# Patient Record
Sex: FEMALE
[De-identification: names, ages, dates, MRNs, and addresses within clinical notes are randomized per-mention and may not be internally consistent; named-entity substitution may affect disease eponyms.]

---

## 2017-08-28 ENCOUNTER — HOSPITAL ENCOUNTER (OUTPATIENT)
Dept: HOSPITAL 42 - CATH | Age: 67
Discharge: HOME | End: 2017-08-28
Payer: MEDICARE

## 2017-08-28 VITALS — SYSTOLIC BLOOD PRESSURE: 130 MMHG | RESPIRATION RATE: 18 BRPM | DIASTOLIC BLOOD PRESSURE: 67 MMHG | HEART RATE: 78 BPM

## 2017-08-28 VITALS — OXYGEN SATURATION: 98 %

## 2017-08-28 VITALS — BODY MASS INDEX: 44.9 KG/M2

## 2017-08-28 VITALS — TEMPERATURE: 98.2 F

## 2017-08-28 DIAGNOSIS — I10: ICD-10-CM

## 2017-08-28 DIAGNOSIS — E66.9: ICD-10-CM

## 2017-08-28 DIAGNOSIS — E11.9: ICD-10-CM

## 2017-08-28 DIAGNOSIS — E78.5: ICD-10-CM

## 2017-08-28 DIAGNOSIS — R94.39: Primary | ICD-10-CM

## 2017-08-28 DIAGNOSIS — Z79.4: ICD-10-CM

## 2017-08-28 DIAGNOSIS — R20.0: ICD-10-CM

## 2017-08-28 LAB
APTT BLD: 25.7 SECONDS (ref 23.7–30.8)
BASOPHILS # BLD AUTO: 0.02 K/MM3 (ref 0–2)
BASOPHILS NFR BLD: 0.4 % (ref 0–3)
BUN SERPL-MCNC: 15 MG/DL (ref 7–21)
CALCIUM SERPL-MCNC: 9.9 MG/DL (ref 8.4–10.5)
CHLORIDE SERPL-SCNC: 105 MMOL/L (ref 98–107)
CHOLEST SERPL-MCNC: 159 MG/DL (ref 130–200)
CO2 SERPL-SCNC: 28 MMOL/L (ref 21–33)
EOSINOPHIL # BLD: 0.2 10*3/UL (ref 0–0.7)
EOSINOPHIL NFR BLD: 3.1 % (ref 1.5–5)
ERYTHROCYTE [DISTWIDTH] IN BLOOD BY AUTOMATED COUNT: 13.6 % (ref 11.5–14.5)
GLUCOSE SERPL-MCNC: 128 MG/DL (ref 70–110)
GRANULOCYTES # BLD: 2.65 10*3/UL (ref 1.4–6.5)
GRANULOCYTES NFR BLD: 53.9 % (ref 50–68)
HCT VFR BLD CALC: 34.5 % (ref 36–48)
INR PPP: 0.99 (ref 0.93–1.08)
LYMPHOCYTES # BLD: 1.9 10*3/UL (ref 1.2–3.4)
LYMPHOCYTES NFR BLD AUTO: 37.7 % (ref 22–35)
MCH RBC QN AUTO: 29.4 PG (ref 25–35)
MCHC RBC AUTO-ENTMCNC: 33.3 G/DL (ref 31–37)
MCV RBC AUTO: 88.2 FL (ref 80–105)
MONOCYTES # BLD AUTO: 0.2 10*3/UL (ref 0.1–0.6)
MONOCYTES NFR BLD: 4.9 % (ref 1–6)
PLATELET # BLD: 376 10^3/UL (ref 120–450)
PMV BLD AUTO: 9.6 FL (ref 7–11)
POTASSIUM SERPL-SCNC: 4 MMOL/L (ref 3.6–5)
SODIUM SERPL-SCNC: 143 MMOL/L (ref 132–148)
WBC # BLD AUTO: 4.9 10^3/UL (ref 4.5–11)

## 2017-08-28 PROCEDURE — 85610 PROTHROMBIN TIME: CPT

## 2017-08-28 PROCEDURE — 80048 BASIC METABOLIC PNL TOTAL CA: CPT

## 2017-08-28 PROCEDURE — 80061 LIPID PANEL: CPT

## 2017-08-28 PROCEDURE — 86850 RBC ANTIBODY SCREEN: CPT

## 2017-08-28 PROCEDURE — 85025 COMPLETE CBC W/AUTO DIFF WBC: CPT

## 2017-08-28 PROCEDURE — 85730 THROMBOPLASTIN TIME PARTIAL: CPT

## 2017-08-28 PROCEDURE — 86900 BLOOD TYPING SEROLOGIC ABO: CPT

## 2017-08-28 PROCEDURE — 36415 COLL VENOUS BLD VENIPUNCTURE: CPT

## 2017-08-28 PROCEDURE — 99152 MOD SED SAME PHYS/QHP 5/>YRS: CPT

## 2017-08-28 PROCEDURE — 93458 L HRT ARTERY/VENTRICLE ANGIO: CPT

## 2017-08-28 PROCEDURE — 99153 MOD SED SAME PHYS/QHP EA: CPT

## 2017-08-28 NOTE — CP.PCM.PN
Subjective





- Date & Time of Evaluation


Date of Evaluation: 08/28/17


Time of Evaluation: 20:37





- Subjective


Subjective: 








 Patient was seen at bedside.


 She complained of numbness in right hand.


 Numbness is present since the procedure, not increasing.


 No pain, no weakness, no swelling.


 ROS:Negative except a s mentioned above.


 66 year old woman is here for cardiac cath, is S/P cardiac cath.


 Has PMH of CAD, obesity , 


 





Objective





- Vital Signs/Intake and Output


Vital Signs (last 24 hours): 


 











Temp Pulse Resp BP Pulse Ox


 


 98.2 F   86   18   152/77 H  98 


 


 08/28/17 19:30  08/28/17 19:30  08/28/17 19:30  08/28/17 19:30  08/28/17 12:00











- Medications


Medications: 


 Current Medications





Acetaminophen (Tylenol 325mg Tab)  650 mg PO Q4H PRN


   PRN Reason: Pain, Mild (1-3)


Sodium Chloride (Sodium Chloride 0.9%)  1,000 mls @ 100 mls/hr IV .Q10H [id number]











- Labs


Labs: 


 





 08/28/17 12:00 





 08/28/17 12:00 





 











PT  10.7 Seconds (9.9-11.8)   08/28/17  12:00    


 


INR  0.99  (0.93-1.08)   08/28/17  12:00    


 


APTT  25.7 Seconds (23.7-30.8)   08/28/17  12:00    














- Constitutional


Appears: Well, No Acute Distress





- Head Exam


Head Exam: ATRAUMATIC, NORMAL INSPECTION, NORMOCEPHALIC


Additional comments: 





Obese person , sitting in chair.





- Eye Exam


Eye Exam: Normal appearance





- ENT Exam


ENT Exam: Normal External Ear Exam





- Neck Exam


Neck Exam: Normal Inspection





- Respiratory Exam


Respiratory Exam: NORMAL BREATHING PATTERN





- Cardiovascular Exam


Cardiovascular Exam: absent: JVD





- GI/Abdominal Exam


GI & Abdominal Exam: absent: Distended





- Rectal Exam


Rectal Exam: Deferred





-  Exam


Additional comments: 





Deferred.





- Extremities Exam


Extremities Exam: Normal Inspection





- Back Exam


Back Exam: NORMAL INSPECTION





- Neurological Exam


Neurological Exam: Alert, Oriented x3





- Psychiatric Exam


Psychiatric exam: Normal Affect, Normal Mood





- Skin


Skin Exam: Normal Color





Assessment and Plan





- Assessment and Plan (Free Text)


Assessment: 





Right hand numbness.


S/P cardiac cath.


CAD.


Obesity.


Plan: 





Reassurance.


Observation.


Asked nurse to call cardiologist and inform before patient can be discharged.

## 2017-08-29 NOTE — CARDCATH
PROCEDURE DATE:  08/28/2017



PROCEDURES:

1.  Left heart catheterization.

2.  Coronary angiogram.



CLINICAL INDICATIONS:

1.  Abnormal stress test.

2.  Diabetes.

3.  Hypertension.

4.  Hyperlipidemia.



REFERRING PHYSICIAN:  Swathi Wallace MD.



PERFORMING PHYSICIAN:  Tone Collier MD.



PROCEDURE:  After informed consent, the patient was prepped and draped in

the usual sterile fashion.  A 2% lidocaine was given in the right wrist for

local anesthesia.  Using micropuncture technique, 6-Micronesian sheath was

introduced into the right common femoral artery.  Using the usual

diagnostic catheters, left heart catheterization and coronary angiogram was

performed.  The patient tolerated the procedure well.  Postprocedure,

Terumo radial band applied to the right wrist with excellent hemostasis.



FINDINGS:

1.  Left main coronary artery is patent.

2.  LAD and diagonal branches are patent.

3.  Left circumflex and obtuse marginal branches are patent.

4.  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  Right

coronary artery is patent.

5.  LV ejection fraction is 70%.  ADP is 13.  No gradient across the aortic

valve.  No wall motion abnormalities noted.



IMPRESSION:

1.  Normal coronaries.

2.  Normal left ventricular systolic function.



Recommend medical management including risk factor modification.







__________________________________________

Tone Collier MD





DD:  08/28/2017 17:58:39

DT:  08/29/2017 0:13:32

Job # [id number]

## 2018-01-23 ENCOUNTER — HOSPITAL ENCOUNTER (OUTPATIENT)
Dept: HOSPITAL 31 - C.ENDO | Age: 68
Discharge: HOME | End: 2018-01-23
Attending: SPECIALIST
Payer: MEDICARE

## 2018-01-23 VITALS
HEART RATE: 71 BPM | SYSTOLIC BLOOD PRESSURE: 117 MMHG | RESPIRATION RATE: 18 BRPM | DIASTOLIC BLOOD PRESSURE: 59 MMHG | OXYGEN SATURATION: 98 %

## 2018-01-23 VITALS — BODY MASS INDEX: 44.9 KG/M2

## 2018-01-23 VITALS — TEMPERATURE: 97.3 F

## 2018-01-23 DIAGNOSIS — K64.4: ICD-10-CM

## 2018-01-23 DIAGNOSIS — E11.9: ICD-10-CM

## 2018-01-23 DIAGNOSIS — I10: ICD-10-CM

## 2018-01-23 DIAGNOSIS — K58.9: Primary | ICD-10-CM

## 2018-01-23 DIAGNOSIS — K64.8: ICD-10-CM

## 2018-01-23 LAB
ALBUMIN SERPL-MCNC: 4.2 G/DL (ref 3.5–5)
ALBUMIN/GLOB SERPL: 1.2 {RATIO} (ref 1–2.1)
ALT SERPL-CCNC: 23 U/L (ref 9–52)
APTT BLD: 31 SECONDS (ref 21–34)
AST SERPL-CCNC: 16 U/L (ref 14–36)
BASOPHILS # BLD AUTO: 0.1 K/UL (ref 0–0.2)
BASOPHILS NFR BLD: 1.1 % (ref 0–2)
BUN SERPL-MCNC: 16 MG/DL (ref 7–17)
CALCIUM SERPL-MCNC: 9.3 MG/DL (ref 8.6–10.4)
EOSINOPHIL # BLD AUTO: 0.2 K/UL (ref 0–0.7)
EOSINOPHIL NFR BLD: 3.6 % (ref 0–4)
ERYTHROCYTE [DISTWIDTH] IN BLOOD BY AUTOMATED COUNT: 14.7 % (ref 11.5–14.5)
GFR NON-AFRICAN AMERICAN: > 60
HDLC SERPL-MCNC: 48 MG/DL (ref 30–70)
HGB BLD-MCNC: 12.3 G/DL (ref 11–16)
INR PPP: 1
LDLC SERPL-MCNC: 75 MG/DL (ref 0–129)
LYMPHOCYTES # BLD AUTO: 1.7 K/UL (ref 1–4.3)
LYMPHOCYTES NFR BLD AUTO: 26.9 % (ref 20–40)
MCH RBC QN AUTO: 30.4 PG (ref 27–31)
MCHC RBC AUTO-ENTMCNC: 33.9 G/DL (ref 33–37)
MCV RBC AUTO: 89.6 FL (ref 81–99)
MONOCYTES # BLD: 0.4 K/UL (ref 0–0.8)
MONOCYTES NFR BLD: 5.8 % (ref 0–10)
NEUTROPHILS # BLD: 4 K/UL (ref 1.8–7)
NEUTROPHILS NFR BLD AUTO: 62.6 % (ref 50–75)
NRBC BLD AUTO-RTO: 0 % (ref 0–2)
PLATELET # BLD: 370 K/UL (ref 130–400)
PMV BLD AUTO: 8 FL (ref 7.2–11.7)
PROTHROMBIN TIME: 11.2 SECONDS (ref 9.7–12.2)
RBC # BLD AUTO: 4.05 MIL/UL (ref 3.8–5.2)
T3: 1.58 NMOL/L (ref 1.49–2.6)
T4 SERPL-MCNC: 11.2 UG/DL (ref 5.5–11)
WBC # BLD AUTO: 6.4 K/UL (ref 4.8–10.8)

## 2018-01-23 PROCEDURE — 84480 ASSAY TRIIODOTHYRONINE (T3): CPT

## 2018-01-23 PROCEDURE — 82378 CARCINOEMBRYONIC ANTIGEN: CPT

## 2018-01-23 PROCEDURE — 85610 PROTHROMBIN TIME: CPT

## 2018-01-23 PROCEDURE — 80061 LIPID PANEL: CPT

## 2018-01-23 PROCEDURE — 82948 REAGENT STRIP/BLOOD GLUCOSE: CPT

## 2018-01-23 PROCEDURE — 86304 IMMUNOASSAY TUMOR CA 125: CPT

## 2018-01-23 PROCEDURE — 88305 TISSUE EXAM BY PATHOLOGIST: CPT

## 2018-01-23 PROCEDURE — 36415 COLL VENOUS BLD VENIPUNCTURE: CPT

## 2018-01-23 PROCEDURE — 80053 COMPREHEN METABOLIC PANEL: CPT

## 2018-01-23 PROCEDURE — 84443 ASSAY THYROID STIM HORMONE: CPT

## 2018-01-23 PROCEDURE — 85730 THROMBOPLASTIN TIME PARTIAL: CPT

## 2018-01-23 PROCEDURE — 45380 COLONOSCOPY AND BIOPSY: CPT

## 2018-01-23 PROCEDURE — 85025 COMPLETE CBC W/AUTO DIFF WBC: CPT

## 2018-01-23 PROCEDURE — 84436 ASSAY OF TOTAL THYROXINE: CPT

## 2018-01-23 NOTE — CP.SDSHP
Same Day [facility]





- History


Proposed Procedure: COLONSCOPY


Pre-Op Diagnosis: SEE NOTES





- Previous Medical/Surgical History


Cardiac: Hypertension


Endocrine/Metabolic: Thyroid Disease, Diabetes, Other


Neuro: Backaches





- Allergies


Allergies: 


Allergies





No Known Allergies Allergy (Verified 08/21/17 08:38)


 











- Physical Exam


General Appearance: N


Vital Signs: 


 Vital Signs











  01/23/18





  08:05


 


Temperature 97.3 F L


 


Pulse Rate 75


 


Respiratory 19





Rate 


 


Blood Pressure 117/82


 


O2 Sat by Pulse 97





Oximetry 











Mental Status: Alert & Oriented x3


Neuro: WNL


Heart: Other


GI: WNL





- {Optional Preform as Required}


Breast: WNL


Abdomen: Other


Rectal: Other


Integument: WNL


: WNL


Ortho: Other


ENT: WNL





- Impression


Pt. Evaluated Today:Candidate for Anesthesia & Procedure: Yes





- Date & Time


Time: 09:55





Short Stay Discharge





- Short Stay Discharge


Admitting Diagnosis/Reason for Visit: HEMORRHAGE OF ANUS AND RECTUM


Disposition: HOME/ ROUTINE

## 2018-06-25 ENCOUNTER — HOSPITAL ENCOUNTER (OUTPATIENT)
Dept: HOSPITAL 31 - C.ENDO | Age: 68
Discharge: HOME | End: 2018-06-25
Attending: SPECIALIST
Payer: COMMERCIAL

## 2018-06-25 VITALS — RESPIRATION RATE: 11 BRPM | SYSTOLIC BLOOD PRESSURE: 127 MMHG | DIASTOLIC BLOOD PRESSURE: 70 MMHG

## 2018-06-25 VITALS — HEART RATE: 67 BPM | OXYGEN SATURATION: 100 %

## 2018-06-25 VITALS — BODY MASS INDEX: 44.9 KG/M2

## 2018-06-25 VITALS — TEMPERATURE: 97.4 F

## 2018-06-25 DIAGNOSIS — K64.8: ICD-10-CM

## 2018-06-25 DIAGNOSIS — K58.9: Primary | ICD-10-CM

## 2018-06-25 DIAGNOSIS — R19.4: ICD-10-CM

## 2018-06-25 PROCEDURE — 88305 TISSUE EXAM BY PATHOLOGIST: CPT

## 2018-06-25 PROCEDURE — 45380 COLONOSCOPY AND BIOPSY: CPT

## 2018-06-25 PROCEDURE — 82948 REAGENT STRIP/BLOOD GLUCOSE: CPT

## 2018-06-25 NOTE — CP.SDSHP
[facility] Day [facility]





- History


Proposed Procedure: colonscopy


Pre-Op Diagnosis: SEE NOTES





- Previous Medical/Surgical History


Cardiac: Hypertension


Endocrine/Metabolic: Thyroid Disease, Diabetes, Other


Neuro: Backaches





- Allergies


Allergies: 


Allergies





No Known Allergies Allergy (Verified 08/21/17 08:38)


 











- Physical Exam


General Appearance: N


Vital Signs: 


 Vital Signs











  06/25/18





  10:30


 


Temperature 98 F


 


Pulse Rate 79


 


Respiratory 19





Rate 


 


Blood Pressure 143/83


 


O2 Sat by Pulse 98





Oximetry 











Mental Status: Alert & Oriented x3


Neuro: WNL


Heart: Other


Lungs: WNL


GI: Other





- {Optional Preform as Required}


Breast: WNL


Abdomen: Other


Rectal: Other


Integument: WNL


: WNL


Ortho: Other


ENT: WNL





- Impression


Pt. Evaluated Today:Candidate for Anesthesia & Procedure: Yes





- Date & Time


Time: 11:26





Short Stay Discharge





- Short Stay Discharge


Admitting Diagnosis/Reason for Visit: CHANGE OF BOWEL HABITS


Disposition: HOME/ ROUTINE

## 2018-09-02 ENCOUNTER — HOSPITAL ENCOUNTER (EMERGENCY)
Dept: HOSPITAL 42 - ED | Age: 68
Discharge: HOME | End: 2018-09-02
Payer: COMMERCIAL

## 2018-09-02 VITALS — BODY MASS INDEX: 44.9 KG/M2

## 2018-09-02 VITALS
DIASTOLIC BLOOD PRESSURE: 77 MMHG | TEMPERATURE: 98.6 F | HEART RATE: 72 BPM | SYSTOLIC BLOOD PRESSURE: 120 MMHG | OXYGEN SATURATION: 99 %

## 2018-09-02 VITALS — RESPIRATION RATE: 18 BRPM

## 2018-09-02 DIAGNOSIS — E03.9: ICD-10-CM

## 2018-09-02 DIAGNOSIS — N39.0: Primary | ICD-10-CM

## 2018-09-02 DIAGNOSIS — E11.9: ICD-10-CM

## 2018-09-02 DIAGNOSIS — I10: ICD-10-CM

## 2018-09-02 DIAGNOSIS — E78.5: ICD-10-CM

## 2018-09-02 LAB
ALBUMIN SERPL-MCNC: 4.6 G/DL (ref 3–4.8)
ALBUMIN/GLOB SERPL: 1.2 {RATIO} (ref 1.1–1.8)
ALT SERPL-CCNC: 12 U/L (ref 7–56)
APPEARANCE UR: CLEAR
AST SERPL-CCNC: 21 U/L (ref 14–36)
BACTERIA #/AREA URNS HPF: (no result) /[HPF]
BASOPHILS # BLD AUTO: 0.02 K/MM3 (ref 0–2)
BASOPHILS NFR BLD: 0.2 % (ref 0–3)
BILIRUB UR-MCNC: NEGATIVE MG/DL
BUN SERPL-MCNC: 11 MG/DL (ref 7–21)
CALCIUM SERPL-MCNC: 10 MG/DL (ref 8.4–10.5)
COLOR UR: YELLOW
EOSINOPHIL # BLD: 0.1 10*3/UL (ref 0–0.7)
EOSINOPHIL NFR BLD: 0.9 % (ref 1.5–5)
ERYTHROCYTE [DISTWIDTH] IN BLOOD BY AUTOMATED COUNT: 14.3 % (ref 11.5–14.5)
GFR NON-AFRICAN AMERICAN: > 60
GLUCOSE UR STRIP-MCNC: NEGATIVE MG/DL
GRANULOCYTES # BLD: 8.16 10*3/UL (ref 1.4–6.5)
GRANULOCYTES NFR BLD: 69.9 % (ref 50–68)
HGB BLD-MCNC: 12.5 G/DL (ref 12–16)
LEUKOCYTE ESTERASE UR-ACNC: (no result) LEU/UL
LYMPHOCYTES # BLD: 2.5 10*3/UL (ref 1.2–3.4)
LYMPHOCYTES NFR BLD AUTO: 21.2 % (ref 22–35)
MCH RBC QN AUTO: 29.2 PG (ref 25–35)
MCHC RBC AUTO-ENTMCNC: 34 G/DL (ref 31–37)
MCV RBC AUTO: 86 FL (ref 80–105)
MONOCYTES # BLD AUTO: 0.9 10*3/UL (ref 0.1–0.6)
MONOCYTES NFR BLD: 7.8 % (ref 1–6)
PH UR STRIP: 6 [PH] (ref 4.7–8)
PLATELET # BLD: 420 10^3/UL (ref 120–450)
PMV BLD AUTO: 9.7 FL (ref 7–11)
PROT UR STRIP-MCNC: NEGATIVE MG/DL
RBC # BLD AUTO: 4.28 10^6/UL (ref 3.5–6.1)
RBC # UR STRIP: NEGATIVE /UL
RBC #/AREA URNS HPF: NEGATIVE /HPF (ref 0–2)
SP GR UR STRIP: 1.02 (ref 1–1.03)
UROBILINOGEN UR STRIP-ACNC: 0.2 E.U./DL
WBC # BLD AUTO: 11.7 10^3/UL (ref 4.5–11)

## 2018-09-02 PROCEDURE — 80053 COMPREHEN METABOLIC PANEL: CPT

## 2018-09-02 PROCEDURE — 74176 CT ABD & PELVIS W/O CONTRAST: CPT

## 2018-09-02 PROCEDURE — 85025 COMPLETE CBC W/AUTO DIFF WBC: CPT

## 2018-09-02 PROCEDURE — 96374 THER/PROPH/DIAG INJ IV PUSH: CPT

## 2018-09-02 PROCEDURE — 87086 URINE CULTURE/COLONY COUNT: CPT

## 2018-09-02 PROCEDURE — 99283 EMERGENCY DEPT VISIT LOW MDM: CPT

## 2018-09-02 PROCEDURE — 81001 URINALYSIS AUTO W/SCOPE: CPT

## 2018-09-02 NOTE — ED PDOC
Arrival/HPI





- General


Historian: Patient





- General


Chief Complaint: Back Pain


Time Seen by Provider: 09/02/18 13:15





- History of Present Illness


Narrative History of Present Illness (Text): 





09/02/18 13:48


67 year old female, past medical history of DM, HTN, hyperlipidemia, arthritis, 

and constipation, presents to the ED with back pain. Patient states the back 

pain started all of a sudden on Thursday and has progressively worsened. 

Described as a dull, constant, achy pain that radiates anteriorly, bilaterally. 

She saw her PMD on friday who did not provide any medication for pain relief. 

Friday night, the pain worsened and she took a Tylenol 3 that she had 

previously which provided relief and she was able to sleep. Patient endorses 

pain with movement and unable to lay on her back. Had a similar symptoms 2 

years ago that was treated as a UTI. Denies fever, chills, nausea, vomiting, 

shortness of breath, headache, dizziness, chest pain, palpitations, dysuria, 

hematuria, burning or itching with urination, increased frequency, or 

incontinence. 





PMD: Dr. Riky Allen ([facility])





Past Medical History





- Provider Review


Nursing Documentation Reviewed: Yes





- Reproductive


Menopause: Yes





- Cardiac


Hx Cardiac Disorders: Yes


Hx Hypertension: Yes





- Pulmonary


Hx Respiratory Disorders: No





- Neurological


Hx Neurological Disorder: No





- HEENT


Hx HEENT Disorder: Yes


Hx Cataracts: Yes





- Renal


Hx Renal Disorder: No





- Endocrine/Metabolic


Hx Endocrine Disorders: Yes


Hx Diabetes Mellitus Type 1: Yes


Hx Hypothyroidism: Yes





- Hematological/Oncological


Hx Blood Disorders: No





- Integumentary


Hx Dermatological Disorder: No





- Musculoskeletal/Rheumatological


Hx Musculoskeletal Disorders: Yes


Hx Arthritis: Yes





- Gastrointestinal


Hx Gastrointestinal Disorders: Yes


Hx Gastritis: Yes


Hx Gastroesophageal Reflux: Yes





- Genitourinary/Gynecological


Hx Genitourinary Disorders: No





- Psychiatric


Hx Psychophysiologic Disorder: No


Hx Substance Use: No





- Surgical History


Hx Tonsillectomy: Yes (41 YRS AGO)





- Anesthesia


Hx Anesthesia: Yes


Hx Anesthesia Reactions: No


Hx Malignant Hyperthermia: No





- Suicidal Assessment


Feels Threatened In Home Enviroment: No





Family/Social History





- Physician Review


Nursing Documentation Reviewed: Yes


Family/Social History: No Known Family HX


Smoking Status: Never Smoked


Hx Alcohol Use: Yes (MODERATE IN THE PAST)


Hx Substance Use: No





Allergies/Home Meds


Allergies/Adverse Reactions: 


Allergies





No Known Allergies Allergy (Verified 09/02/18 13:03)


 








Home Medications: 


 Home Meds











 Medication  Instructions  Recorded  Confirmed


 


Aspirin [Ecotrin] 81 mg PO DAILY 08/21/17 09/02/18


 


Dulaglutide [Trulicity] 0.75 mg SC Q7D 08/21/17 09/02/18


 


Enalapril Maleate [Vasotec] 10 mg PO DAILY 08/21/17 09/02/18


 


Furosemide [Lasix] 40 mg PO DAILY 08/21/17 09/02/18


 


Insulin Aspart, Recombinant 20 unit SQ DAILY 08/21/17 09/02/18





[Novolog]   


 


Insulin Glargine, Recombina 100 unit SC DAILY 08/21/17 09/02/18





[Lantus]   


 


Insulin Lispro [humALOG] 20 units SC BID 08/21/17 09/02/18


 


Levothyroxine [Synthroid] 100 mcg PO DAILY 08/21/17 09/02/18


 


Meloxicam [Mobic] 15 mg PO DAILY 08/21/17 09/02/18


 


MetFORMIN [glucOPHAGE] 1,000 mg PO BID 08/21/17 09/02/18


 


Simvastatin [Zocor] 40 mg PO DAILY 08/21/17 09/02/18














Review of Systems





- Physician Review


All systems were reviewed & negative as marked: Yes





- Review of Systems


Constitutional: absent: Fevers, Night Sweats


Eyes: absent: Vision Changes


ENT: absent: Tinnitus


Respiratory: absent: SOB, Cough


Cardiovascular: absent: Chest Pain, Palpitations


Gastrointestinal: Constipation.  absent: Abdominal Pain, Nausea, Vomiting


Genitourinary Female: absent: Dysuria, Frequency, Hematuria, Urine Output 

Changes


Musculoskeletal: Back Pain


Skin: absent: Rash, Skin Lesions


Neurological: absent: Headache, Dizziness


Endocrine: absent: Diaphoresis





Physical Exam


Vital Signs Reviewed: Yes


Temperature: Afebrile


Blood Pressure: Normal


Pulse: Tachycardic


Respiratory Rate: Normal


Appearance: Positive for: Well-Appearing, Non-Toxic, Uncomfortable


Pain Distress: Moderate


Mental Status: Positive for: Alert and Oriented X 3





- Systems Exam


Head: Present: Atraumatic, Normocephalic


Pupils: Present: PERRL


Extroacular Muscles: Present: EOMI


Mouth: Present: Dry


Pharnyx: Present: Normal


Nose (External): Present: Atraumatic


Respiratory/Chest: Present: Clear to Auscultation, Good Air Exchange.  No: 

Respiratory Distress, Accessory Muscle Use


Cardiovascular: Present: Regular Rate and Rhythm, Normal S1, S2.  No: Murmurs


Abdomen: Present: Tenderness.  No: Distention, Peritoneal Signs, Rebound, 

Guarding


Back: Present: CVA Tenderness, Paraspinal Tenderness


Neurological: Present: Speech Normal


Skin: Present: Warm, Dry


Psychiatric: Present: Alert, Oriented x 3


Vital Signs











  Temp Pulse Resp BP Pulse Ox


 


 09/02/18 17:11  98.6 F  72  18  120/77  99


 


 09/02/18 16:00  98.3 F  78  18  123/79  98


 


 09/02/18 14:50   82  18  128/79  98


 


 09/02/18 13:01  99 F  100 H  18  122/79  100














Medical Decision Making


Reassessment Condition: Improved


ED Course and Treatment: 





09/02/18 14:13


67F presents to the ED with worsening flank pain radiating anteriorly 

bilaterally. Afebrile. 





CBC, CMP, UA, and CT ordered. 


Toradol for pain. 





UA showed trace leukocyte esterase.


CBC showed a mild increase in white blood cells. 





PROCEDURE:  CT Abdomen and Pelvis without Oral or IV contrast.





HISTORY:


back pain, r/o hydronephrosis, r/o kidney stones





COMPARISON:


None available





TECHNIQUE:


Contiguous axial images of the abdomen and pelvis. No oral or IV contrast 

administered. Coronal and Sagittal reformats generated and reviewed. 





Radiation dose:





Total exam DLP = 1164.36 mGy-cm.





This CT exam was performed using one or more of the following dose reduction 

techniques: Automated exposure control, adjustment of the mA and/or kV 

according to patient size, and/or use of iterative reconstruction technique.





FINDINGS:


There is limited evaluation of the solid organs without the administration of 

IV contrast.





LOWER THORAX:


No visible consolidation, pleural effusion, or pneumothorax.





LIVER:


Unremarkable unenhanced appearance. 





GALLBLADDER AND BILE DUCTS:


Unremarkable unenhanced appearance. 





PANCREAS:


Unremarkable unenhanced appearance. 





SPLEEN:


Unremarkable unenhanced appearance. 





ADRENALS:


Unremarkable unenhanced appearance. 





KIDNEYS AND URETERS:


13 mm low-density lesion, right kidney measures approximately 8 HU, likely 

cyst. No hydronephrosis or obstructing renal calculus. 





BLADDER:


Under distended urinary bladder limits evaluation.





REPRODUCTIVE:


Lobulated enlarged uterus containing calcifications consistent with fibroid 

uterus. 





APPENDIX:


The appendix is not identified.  No secondary signs of acute appendicitis.





BOWEL:


The stomach is nondistended. 





Lack of oral contrast limits evaluation for bowel pathology.   The bowel loops 

appear within normal limits of caliber without evidence of intestinal 

obstruction.





PERITONEUM:


No significant free fluid. No definite free air.





LYMPH NODES:


No bulky lymphadenopathy identified.





VASCULATURE:


No aortic aneurysm. 





BONES:


Degenerative changes.





OTHER FINDINGS:


Pelvic calcifications, likely phleboliths. 





IMPRESSION:


13 mm low-density lesion, right kidney measures approximately 8 HU, likely 

cyst. No hydronephrosis or obstructing renal calculus. 





Lobulated enlarged uterus containing calcifications consistent with fibroid 

uterus. 





Additional findings as above.





09/02/18 16:54


Patient states pain has improved significantly. Was able to lie comfortably on 

her back and relax while in the ED. 





Patient will be discharged with Ciprofloxacin 500mg BID for 7 days. 


Please take antibiotics as prescribed. 


Please follow up with your primary medical doctor within 3-5 days. 





If symptoms reoccur or worsen, please return to the ED. 








09/02/18 17:37


Patient verbalized agreement and understanding of treatment plan.


Case discussed and reviewed with attending provider.


 (Al Reynoso)


09/02/18 17:15


Patient Seen With Resident:


In agreement with resident note. Patient was seen and evaluated with resident, 

came up with plan and treatment together. 67 year old F p/w bilateral flank 

pain. On exam, R CVA tenderness. (Uriel Zhang)





- Lab Interpretations


Lab Results: 








 09/02/18 13:10 





 09/02/18 13:10 





 Lab Results





09/02/18 13:10: Sodium 139, Potassium 4.1, Chloride 98, Carbon Dioxide 27, 

Anion Gap 18, BUN 11, Creatinine 0.7, Est GFR (African Amer) > 60, Est GFR (Non-

Af Amer) > 60, Random Glucose 165 H, Calcium 10.0, Total Bilirubin 0.6, AST 21, 

ALT 12, Alkaline Phosphatase 98, Total Protein 8.4 H, Albumin 4.6, Globulin 3.8

, Albumin/Globulin Ratio 1.2


09/02/18 13:10: Urine Color Yellow, Urine Appearance Clear, Urine pH 6.0, Ur 

Specific Gravity 1.025, Urine Protein Negative, Urine Glucose (UA) Negative, 

Urine Ketones Trace H, Urine Blood Negative, Urine Nitrate Negative, Urine 

Bilirubin Negative, Urine Urobilinogen 0.2, Ur Leukocyte Esterase Trace H, 

Urine RBC Negative, Urine WBC 1 - 3, Ur Epithelial Cells 1 - 3, Urine Bacteria 

Few


09/02/18 13:10: WBC 11.7 H D, RBC 4.28, Hgb 12.5, Hct 36.8, MCV 86.0, MCH 29.2, 

MCHC 34.0, RDW 14.3, Plt Count 420, MPV 9.7, Gran % 69.9 H, Lymph % (Auto) 21.2 

L, Mono % (Auto) 7.8 H, Eos % (Auto) 0.9 L, Baso % (Auto) 0.2, Gran # 8.16 H, 

Lymph # (Auto) 2.5, Mono # (Auto) 0.9 H, Eos # (Auto) 0.1, Baso # (Auto) 0.02











- RAD Interpretation


Radiology Orders: 








09/02/18 14:16


ABDOMEN & PELVIS [ABD & PELVIS W/O PO OR IV CONT] [CT] Stat 














- Medication Orders


Current Medication Orders: 











Discontinued Medications





Ketorolac Tromethamine (Toradol)  30 mg IVP STAT STA


   Stop: 09/02/18 13:59


   Last Admin: 09/02/18 14:31  Dose: 30 mg





MAR Pain Assessment


 Document     09/02/18 14:31  [facility]  (Rec: 09/02/18 14:31  [facility]  [id number])


     Pain Reassessment


      Is this a pain reassessment?               No


     Sleep


      Is patient sleeping during reassessment?   No


     Presence of Pain


      Presence of Pain                           Yes


     Pain Scale Used


      Pain Scale Used                            Numeric


IVP Administration


 Document     09/02/18 14:31  [facility]  (Rec: 09/02/18 14:31  [employer] [id number])


     Charges for Administration


      # of IVP Administrations                   1














Disposition/Present on Arrival





- Present on Arrival


Any Indicators Present on Arrival: No


History of DVT/PE: No


History of Uncontrolled Diabetes: No


Urinary Catheter: No


History of Decub. Ulcer: No


History Surgical Site Infection Following: None





- Disposition


Have Diagnosis and Disposition been Completed?: Yes


Disposition Time: 16:59


Patient Plan: Discharge





- Disposition


Diagnosis: 


 UTI (urinary tract infection)





Disposition: HOME/ ROUTINE


Condition: IMPROVED


Discharge Instructions (ExitCare):  Urinary Tract Infection, Adult (DC)


Additional Instructions: 


Patient will be discharged with Ciprofloxacin 500mg BID for 7 days. 


Please take antibiotics as prescribed. 


Please follow up with your primary medical doctor within 3-5 days. 





If symptoms reoccur or worsen, please return to the ED. 


Prescriptions: 


Ciprofloxacin [Cipro] 500 mg PO BID 7 Days #14 tab


Referrals: 


Silver Altamirano MD [Primary Care Provider] - Follow up with primary


Forms:  [facility] (English)

## 2018-09-02 NOTE — CT
PROCEDURE:  CT Abdomen and Pelvis without Oral or IV contrast.



HISTORY:

back pain, r/o hydronephrosis, r/o kidney stones



COMPARISON:

None available



TECHNIQUE:

Contiguous axial images of the abdomen and pelvis. No oral or IV 

contrast administered. Coronal and Sagittal reformats generated and 

reviewed. 



Radiation dose:



Total exam DLP = 1164.36 mGy-cm.



This CT exam was performed using one or more of the following dose 

reduction techniques: Automated exposure control, adjustment of the 

mA and/or kV according to patient size, and/or use of iterative 

reconstruction technique.



FINDINGS:

There is limited evaluation of the solid organs without the 

administration of IV contrast.



LOWER THORAX:

No visible consolidation, pleural effusion, or pneumothorax.



LIVER:

Unremarkable unenhanced appearance. 



GALLBLADDER AND BILE DUCTS:

Unremarkable unenhanced appearance. 



PANCREAS:

Unremarkable unenhanced appearance. 



SPLEEN:

Unremarkable unenhanced appearance. 



ADRENALS:

Unremarkable unenhanced appearance. 



KIDNEYS AND URETERS:

13 mm low-density lesion, right kidney measures approximately 8 HU, 

likely cyst. No hydronephrosis or obstructing renal calculus. 



BLADDER:

Under distended urinary bladder limits evaluation.



REPRODUCTIVE:

Lobulated enlarged uterus containing calcifications consistent with 

fibroid uterus. 



APPENDIX:

The appendix is not identified.  No secondary signs of acute 

appendicitis.



BOWEL:

The stomach is nondistended. 



Lack of oral contrast limits evaluation for bowel pathology.   The 

bowel loops appear within normal limits of caliber without evidence 

of intestinal obstruction.



PERITONEUM:

No significant free fluid. No definite free air.



LYMPH NODES:

No bulky lymphadenopathy identified.



VASCULATURE:

No aortic aneurysm. 



BONES:

Degenerative changes.



OTHER FINDINGS:

Pelvic calcifications, likely phleboliths. 



IMPRESSION:

13 mm low-density lesion, right kidney measures approximately 8 HU, 

likely cyst. No hydronephrosis or obstructing renal calculus. 



Lobulated enlarged uterus containing calcifications consistent with 

fibroid uterus. 



Additional findings as above.

## 2018-11-13 ENCOUNTER — HOSPITAL ENCOUNTER (OUTPATIENT)
Dept: HOSPITAL 31 - C.SDS | Age: 68
Discharge: HOME | End: 2018-11-13
Attending: OTOLARYNGOLOGY
Payer: COMMERCIAL

## 2018-11-13 VITALS — BODY MASS INDEX: 38.9 KG/M2

## 2018-11-13 VITALS — DIASTOLIC BLOOD PRESSURE: 77 MMHG | TEMPERATURE: 98 F | OXYGEN SATURATION: 96 % | SYSTOLIC BLOOD PRESSURE: 155 MMHG

## 2018-11-13 VITALS — HEART RATE: 100 BPM

## 2018-11-13 VITALS — RESPIRATION RATE: 18 BRPM

## 2018-11-13 DIAGNOSIS — J34.2: Primary | ICD-10-CM

## 2018-11-13 DIAGNOSIS — J34.3: ICD-10-CM

## 2018-11-13 PROCEDURE — 30140 RESECT INFERIOR TURBINATE: CPT

## 2018-11-13 PROCEDURE — 88304 TISSUE EXAM BY PATHOLOGIST: CPT

## 2018-11-13 PROCEDURE — 82948 REAGENT STRIP/BLOOD GLUCOSE: CPT

## 2018-11-13 PROCEDURE — 30520 REPAIR OF NASAL SEPTUM: CPT

## 2018-11-13 NOTE — OP
PROCEDURE DATE:  11/13/2018



PREOPERATIVE DIAGNOSES:  Deviated septum, large turbinates.



POSTOPERATIVE DIAGNOSES:  Deviated septum, large turbinates.



PROCEDURE:  Septoplasty, endoscopic bilateral inferior turbinate reduction.



FINDINGS:  Deviated septum, large turbinates.



DESCRIPTION OF PROCEDURE:  The patient was brought into the room, placed in

supine position.  Anesthesia was initiated through an ET tube.  The patient

was draped in usual manner.  Adrenaline-soaked pledgets were inserted into

the nasal cavity.  It remained there for 5 minutes and removed.  The septum

was injected with lidocaine with epinephrine on both sides.  A [place]

incision was made on the left and a mucoperichondrial flap was raised.  A

vertical incision was made in the cartilage leaving a 1.5 cm anterior and

superior strut, and a mucoperichondrial flap was raised on the other side. 

Deviated portion of the cartilage and bone were removed using forceps and

chisel.  A Quilting suture was used to suture the two flaps together and

close the Lenin incision.  The 0 degree scope was inserted into the nasal

cavity.  The inferior turbinates were noted to be enlarged and reduced in

size, first on the left, then on the right, going from inferior to superior

and anterior to posterior directions using scissors.  Bleeding was

controlled on both sides using suction cautery.  Splints were placed.  The

patient was taken off anesthesia and taken to recovery room in a stable

manner.







__________________________________________

Babak Behin, MD





DD:  11/13/2018 11:54:12

DT:  11/13/2018 11:59:49

Job # [id number]

## 2019-04-19 ENCOUNTER — HOSPITAL ENCOUNTER (OUTPATIENT)
Dept: HOSPITAL 31 - C.PAT | Age: 69
Discharge: STILL ADMITTED OR ADMINISTRATIVE | End: 2019-04-19
Payer: COMMERCIAL

## 2019-04-19 ENCOUNTER — HOSPITAL ENCOUNTER (EMERGENCY)
Dept: HOSPITAL 31 - C.ER | Age: 69
Discharge: HOME | End: 2019-04-19
Payer: COMMERCIAL

## 2019-04-19 VITALS
HEART RATE: 91 BPM | SYSTOLIC BLOOD PRESSURE: 138 MMHG | OXYGEN SATURATION: 97 % | RESPIRATION RATE: 18 BRPM | TEMPERATURE: 98.1 F | DIASTOLIC BLOOD PRESSURE: 85 MMHG

## 2019-04-19 VITALS — BODY MASS INDEX: 35.8 KG/M2
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
## 2019-04-19 DIAGNOSIS — S39.012A: Primary | ICD-10-CM

## 2019-04-19 DIAGNOSIS — H25.12: Primary | ICD-10-CM

## 2019-04-19 LAB
BILIRUB UR-MCNC: NEGATIVE MG/DL
GLUCOSE UR STRIP-MCNC: (no result) MG/DL
LEUKOCYTE ESTERASE UR-ACNC: (no result) LEU/UL
PH UR STRIP: 5 [PH] (ref 5–8)
PROT UR STRIP-MCNC: NEGATIVE MG/DL
RBC # UR STRIP: NEGATIVE /UL
SP GR UR STRIP: 1.03 (ref 1–1.03)
SQUAMOUS EPITHIAL: 1 /HPF (ref 0–5)
UROBILINOGEN UR-MCNC: NORMAL MG/DL (ref 0.2–1)

## 2019-04-19 NOTE — RAD
Date of service: Physical or 



04/19/2019



PROCEDURE:  Radiographs of the Lumbar Spine.



HISTORY:

back pain







COMPARISON:

No prior.



TECHNIQUE:

 5 views obtained.



FINDINGS:



BONES:

Scoliosis, secondary degenerative change at multiple levels. 



DISC SPACES:

Unremarkable.



OTHER FINDINGS:

None.



IMPRESSION:

Levoscoliosis, secondary degenerative change.

## 2019-04-19 NOTE — C.PDOC
History Of Present Illness


68 year old female presents to the ED complaining of lower back pain ongoing for

2 weeks. Denies any trauma or falls. Reports she was seen by PMD for same 

complaint and given medications for muscle spasm but she cannot recall 

medication name. Notes medications are not helping with pain relief. Denies any 

extremity weakness or numbness, abdominal pain, or urinary symptoms. 


Time Seen by Provider: 04/19/19 10:18


Chief Complaint (Nursing): Back Pain


History Per: Patient


History/Exam Limitations: no limitations


Onset/Duration Of Symptoms: Days


Current Symptoms Are (Timing): Still Present


Quality Of Discomfort: "Pain"





Past Medical History


Reviewed: Historical Data, Nursing Documentation, Vital Signs


Vital Signs: 





                                Last Vital Signs











Temp  98.1 F   04/19/19 09:52


 


Pulse  91 H  04/19/19 09:52


 


Resp  18   04/19/19 09:52


 


BP  138/85   04/19/19 09:52


 


Pulse Ox  97   04/19/19 09:52














- Medical History


PMH: Arthritis, Colonic Polyps, Fractures (ankle right), Gastritis, Gall Bladder

Disease, HTN, Hypercholesterolemia, Hypothyroidism, Peripheral Edema, Pneumonia 

(6 years ago )


   Denies: Chronic Kidney Disease


Surgical History: Endoscopy, Tonsillectomy


Family History: States: No Known Family Hx





- Social History


Hx Alcohol Use: No


Hx Substance Use: No





- Immunization History


Hx Tetanus Toxoid Vaccination: No


Hx Influenza Vaccination: Yes


Hx Pneumococcal Vaccination: Yes





Review Of Systems


Gastrointestinal: Negative for: Abdominal Pain


Genitourinary: Negative for: Dysuria, Frequency, Hematuria, Vaginal Discharge, 

Vaginal Bleeding


Musculoskeletal: Positive for: Back Pain


Neurological: Negative for: Weakness, Numbness





Physical Exam





- Physical Exam


Appears: Non-toxic, No Acute Distress


Skin: Warm, Dry, No Rash


Head: Normacephalic


Eye(s): bilateral: Normal Inspection


Oral Mucosa: Moist


Chest: Symmetrical


Cardiovascular: Rhythm Regular


Respiratory: Normal Breath Sounds, No Rales, No Rhonchi, No Wheezing


Gastrointestinal/Abdominal: Soft, No Tenderness


Back: No CVA Tenderness, No Decreased ROM, No Paraspinal Tenderness, Other 

(Paralumbar tenderness )


Extremity: Bilateral: Atraumatic, Normal Color And Temperature, Normal ROM


Neurological/Psych: Oriented x3, Normal Speech


Gait: Steady





ED Course And Treatment


O2 Sat by Pulse Oximetry: 97 (RA)


Pulse Ox Interpretation: Normal





Medical Decision Making


Medical Decision Making: 





Plan


- Tylenol 650mg PO


- Motrin 600mg PO


- Lidoderm 1 TD 


- UA








On reevaluation, patient is resting comfortable, is no longer having back pain, 

no fever, no body tenderness, no numbness, no weakness, or abdominal pain. 

Patient was advised to follow up with PMD in 1-2 days. 

















Disposition


Counseled Patient/Family Regarding: Diagnosis, Need For Followup, Rx Given





- Disposition


Disposition: HOME/ ROUTINE


Disposition Time: 11:17


Condition: STABLE


Additional Instructions: 


Apply heat to area for 15-20 minutes at a time 2-3 times per day


Take Motrin for pain every 6-8 hours as needed, with food to not upset stomach


Take Flexeril for muscle pain and spasm every 6-8 hours as needed, caution can 

cause drowsiness


Follow up with your primary medical doctor or clinic in 2-5 days for further 

evaluation


Return to the emergency department at any time if symptoms persist or worsen.


Prescriptions: 


Acetaminophen [Acetaminophen ER] 650 mg PO Q8 #30 tablet.er


Cyclobenzaprine [Cyclobenzaprine HCl] 10 mg PO TID #30 tab


Ibuprofen [Motrin] 600 mg PO Q8 #30 tab


Instructions:  Low Back Pain  (DC)


Forms:  [facility] (English)





- POA


Present On Arrival: None





- Clinical Impression


Clinical Impression: 


 Low back strain








- PA / NP / Resident Statement


MD/DO has reviewed & agrees with the documentation as recorded.





- Scribe Statement


The provider has reviewed the documentation as recorded by the Scribe


Belkis Fisher








All medical record entries made by the Scribe were at my direction and 

personally dictated by me. I have reviewed the chart and agree that the record 

accurately reflects my personal performance of the history, physical exam, 

medical decision making, and the department course for this patient. I have also

 personally directed, reviewed, and agree with the discharge instructions and 

disposition.

## 2019-04-24 ENCOUNTER — HOSPITAL ENCOUNTER (OUTPATIENT)
Dept: HOSPITAL 31 - C.SDS | Age: 69
Discharge: HOME | End: 2019-04-24
Attending: OPHTHALMOLOGY
Payer: COMMERCIAL

## 2019-04-24 VITALS
DIASTOLIC BLOOD PRESSURE: 75 MMHG | OXYGEN SATURATION: 97 % | TEMPERATURE: 97.9 F | HEART RATE: 78 BPM | RESPIRATION RATE: 19 BRPM | SYSTOLIC BLOOD PRESSURE: 139 MMHG

## 2019-04-24 VITALS — BODY MASS INDEX: 35.8 KG/M2

## 2019-04-24 DIAGNOSIS — H25.12: Primary | ICD-10-CM

## 2019-04-24 PROCEDURE — 82948 REAGENT STRIP/BLOOD GLUCOSE: CPT

## 2019-04-24 PROCEDURE — 66984 XCAPSL CTRC RMVL W/O ECP: CPT

## 2019-04-24 NOTE — OP
PROCEDURE DATE:  04/24/2019



PREOPERATIVE DIAGNOSIS:  Mature cataract, left eye.



POSTOPERATIVE DIAGNOSIS:  Mature cataract, left eye.



PROCEDURE:  Phacoemulsification, left eye, insertion of posterior chamber

lens implant.



SURGEON:  Frantz Healy MD



ANESTHESIA TYPE:  Local IV sedation.



PROCEDURE:  The patient was brought into the operating room, placed in

supine position, prepped and draped in the usual fashion for ophthalmic

surgery.  Lid speculum was inserted,  lids and exposing globe.  A

side-port incision was made superiorly and inferiorly with a disposable

sharp blade.  Anterior chamber was filled with Viscoat.  A near clear

corneal incision was made temporally with a 2.75-mm keratome. 

Capsulorrhexis was then performed with Utrata forceps.  Hydrodissection

carried out with balanced salt solution.  Nucleus was phacoemulsified. 

Remaining cortical fragments were removed with a split irrigation and

aspiration system.  The capsular sac was filled with Provisc.  A posterior

chamber lens was then injected into the capsular sac and rotated into

horizontal position.  Provisc was aspirated out of the anterior chamber. 

The pupil was constricted with Miochol.  The wound was found to be

watertight.  Topical Betadine, Timoptic, and TobraDex ointment and pressure

patch were applied.  The patient tolerated the procedure well.







__________________________________________

Frantz Healy MD





DD:  04/24/2019 11:19:57

DT:  04/24/2019 17:24:40

Job # [id number]